# Patient Record
Sex: MALE | Employment: FULL TIME | URBAN - METROPOLITAN AREA
[De-identification: names, ages, dates, MRNs, and addresses within clinical notes are randomized per-mention and may not be internally consistent; named-entity substitution may affect disease eponyms.]

---

## 2022-08-23 PROBLEM — R10.84 GENERALIZED ABDOMINAL PAIN: Status: ACTIVE | Noted: 2022-08-23

## 2022-08-23 PROBLEM — K22.70 BARRETT'S ESOPHAGUS WITHOUT DYSPLASIA: Status: ACTIVE | Noted: 2022-08-23

## 2022-09-07 ENCOUNTER — LAB ENCOUNTER (OUTPATIENT)
Dept: LAB | Age: 47
End: 2022-09-07
Attending: INTERNAL MEDICINE
Payer: COMMERCIAL

## 2022-09-07 DIAGNOSIS — R10.84 GENERALIZED ABDOMINAL PAIN: ICD-10-CM

## 2022-09-07 LAB
CRYPTOSP AG STL QL IA: NEGATIVE
G LAMBLIA AG STL QL IA: NEGATIVE

## 2022-09-07 PROCEDURE — 82656 EL-1 FECAL QUAL/SEMIQ: CPT

## 2022-09-07 PROCEDURE — 87272 CRYPTOSPORIDIUM AG IF: CPT

## 2022-09-07 PROCEDURE — 82705 FATS/LIPIDS FECES QUAL: CPT

## 2022-09-07 PROCEDURE — 87329 GIARDIA AG IA: CPT

## 2022-09-09 LAB
NEUTRAL FAT, FECAL: NORMAL
SPLIT FAT, FECAL: NORMAL

## 2022-09-10 LAB — PANCREATIC ELASTASE , FECAL: 586 UG/G

## 2022-09-25 ENCOUNTER — LAB ENCOUNTER (OUTPATIENT)
Dept: LAB | Facility: HOSPITAL | Age: 47
End: 2022-09-25
Attending: INTERNAL MEDICINE

## 2022-09-25 DIAGNOSIS — Z01.818 PRE-OP TESTING: ICD-10-CM

## 2022-09-26 LAB — SARS-COV-2 RNA RESP QL NAA+PROBE: NOT DETECTED

## 2022-09-28 PROBLEM — K29.60 EROSIVE GASTRITIS: Status: ACTIVE | Noted: 2022-09-28

## 2022-09-28 PROBLEM — Z12.11 SPECIAL SCREENING FOR MALIGNANT NEOPLASM OF COLON: Status: ACTIVE | Noted: 2022-09-28

## 2022-09-28 PROBLEM — K63.5 COLON POLYPS: Status: ACTIVE | Noted: 2022-09-28

## 2022-09-28 PROBLEM — D12.3 BENIGN NEOPLASM OF TRANSVERSE COLON: Status: ACTIVE | Noted: 2022-09-28

## 2022-09-28 PROBLEM — K22.9 IRREGULAR Z LINE OF ESOPHAGUS: Status: ACTIVE | Noted: 2022-09-28

## 2022-09-28 PROBLEM — R19.4 CHANGE IN BOWEL HABITS: Status: ACTIVE | Noted: 2022-09-28

## 2022-09-28 PROBLEM — K64.8 INTERNAL HEMORRHOIDS: Status: ACTIVE | Noted: 2022-09-28

## 2022-09-28 PROBLEM — D12.5 BENIGN NEOPLASM OF SIGMOID COLON: Status: ACTIVE | Noted: 2022-09-28

## 2022-10-06 ENCOUNTER — OFFICE VISIT (OUTPATIENT)
Dept: FAMILY MEDICINE CLINIC | Facility: CLINIC | Age: 47
End: 2022-10-06
Payer: COMMERCIAL

## 2022-10-06 VITALS
SYSTOLIC BLOOD PRESSURE: 110 MMHG | HEIGHT: 70 IN | DIASTOLIC BLOOD PRESSURE: 60 MMHG | WEIGHT: 168.19 LBS | BODY MASS INDEX: 24.08 KG/M2 | OXYGEN SATURATION: 97 % | RESPIRATION RATE: 16 BRPM | HEART RATE: 73 BPM

## 2022-10-06 DIAGNOSIS — L70.8 OTHER ACNE: ICD-10-CM

## 2022-10-06 DIAGNOSIS — F41.1 GAD (GENERALIZED ANXIETY DISORDER): ICD-10-CM

## 2022-10-06 DIAGNOSIS — F98.8 ATTENTION DEFICIT DISORDER (ADD) IN ADULT: ICD-10-CM

## 2022-10-06 DIAGNOSIS — Z00.00 ANNUAL PHYSICAL EXAM: Primary | ICD-10-CM

## 2022-10-06 DIAGNOSIS — F42.9 OBSESSIVE-COMPULSIVE DISORDER, UNSPECIFIED TYPE: ICD-10-CM

## 2022-10-06 PROCEDURE — 3008F BODY MASS INDEX DOCD: CPT | Performed by: FAMILY MEDICINE

## 2022-10-06 PROCEDURE — 99386 PREV VISIT NEW AGE 40-64: CPT | Performed by: FAMILY MEDICINE

## 2022-10-06 PROCEDURE — 3078F DIAST BP <80 MM HG: CPT | Performed by: FAMILY MEDICINE

## 2022-10-06 PROCEDURE — 3074F SYST BP LT 130 MM HG: CPT | Performed by: FAMILY MEDICINE

## 2022-10-06 PROCEDURE — 99213 OFFICE O/P EST LOW 20 MIN: CPT | Performed by: FAMILY MEDICINE

## 2022-10-06 RX ORDER — METHYLPHENIDATE HYDROCHLORIDE 40 MG/1
40 CAPSULE, EXTENDED RELEASE ORAL EVERY MORNING
Qty: 30 CAPSULE | Refills: 0 | Status: SHIPPED | OUTPATIENT
Start: 2022-12-07 | End: 2023-01-06

## 2022-10-06 RX ORDER — ALPRAZOLAM 1 MG/1
1 TABLET, EXTENDED RELEASE ORAL DAILY PRN
Qty: 20 TABLET | Refills: 0 | Status: SHIPPED | OUTPATIENT
Start: 2022-10-06

## 2022-10-06 RX ORDER — METHYLPHENIDATE HYDROCHLORIDE 40 MG/1
40 CAPSULE, EXTENDED RELEASE ORAL EVERY MORNING
Qty: 30 CAPSULE | Refills: 0 | Status: SHIPPED | OUTPATIENT
Start: 2022-10-06 | End: 2022-11-05

## 2022-10-06 RX ORDER — SERTRALINE HYDROCHLORIDE 100 MG/1
150 TABLET, FILM COATED ORAL DAILY
Qty: 135 TABLET | Refills: 0 | Status: SHIPPED | OUTPATIENT
Start: 2022-10-06

## 2022-10-06 RX ORDER — METHYLPHENIDATE HYDROCHLORIDE 40 MG/1
40 CAPSULE, EXTENDED RELEASE ORAL EVERY MORNING
Qty: 30 CAPSULE | Refills: 0 | Status: SHIPPED | OUTPATIENT
Start: 2022-11-06 | End: 2022-12-06

## 2022-10-10 ENCOUNTER — PATIENT MESSAGE (OUTPATIENT)
Dept: FAMILY MEDICINE CLINIC | Facility: CLINIC | Age: 47
End: 2022-10-10

## 2022-10-10 RX ORDER — ROSUVASTATIN CALCIUM 10 MG/1
10 TABLET, COATED ORAL NIGHTLY
Qty: 90 TABLET | Refills: 0 | Status: SHIPPED | OUTPATIENT
Start: 2022-10-10

## 2022-10-10 NOTE — TELEPHONE ENCOUNTER
From: Ann Gamboa  To: Jose Frey DO  Sent: 10/10/2022 10:47 AM CDT  Subject: rosuvastatin    Hi,  i need a refill of rosuvastatin as i ran out. Thanks.

## 2022-10-11 ENCOUNTER — PATIENT MESSAGE (OUTPATIENT)
Dept: FAMILY MEDICINE CLINIC | Facility: CLINIC | Age: 47
End: 2022-10-11

## 2022-10-11 NOTE — TELEPHONE ENCOUNTER
From: Janet Nance  Sent: 10/11/2022 8:09 AM CDT  To: [facility] Clinical Staff  Subject: rosuvastatin    Morning,   just wondering if Dr. Guerrero [facility] is going to submit the prescription for me. Thanks.

## 2022-10-31 ENCOUNTER — PATIENT MESSAGE (OUTPATIENT)
Dept: FAMILY MEDICINE CLINIC | Facility: CLINIC | Age: 47
End: 2022-10-31

## 2022-10-31 NOTE — TELEPHONE ENCOUNTER
From: Kristin Hayden  To: Pati Stuart DO  Sent: 10/31/2022 11:11 AM CDT  Subject: alprazolam    Jarrod Ferreira  The extended release is actually not the one i have been prescribed in the past. I did not realize it until i actually opened the bottle. Is there any way to send in a request for the non extended release?

## 2022-11-03 RX ORDER — ALPRAZOLAM 1 MG/1
TABLET ORAL
Qty: 20 TABLET | Refills: 0 | Status: SHIPPED | OUTPATIENT
Start: 2022-11-03

## 2022-11-03 NOTE — TELEPHONE ENCOUNTER
Subject line in message is regarding alprazolam ER.  10/6/22: prescription:   ALPRAZolam ER 1 MG Oral Tablet 24 Hr    Patient does not want ER for alprazolam.     Approve/deny:

## 2022-11-21 LAB
ABSOLUTE BASOPHILS: 33 CELLS/UL (ref 0–200)
ABSOLUTE EOSINOPHILS: 59 CELLS/UL (ref 15–500)
ABSOLUTE LYMPHOCYTES: 1645 CELLS/UL (ref 850–3900)
ABSOLUTE MONOCYTES: 494 CELLS/UL (ref 200–950)
ABSOLUTE NEUTROPHILS: 4271 CELLS/UL (ref 1500–7800)
ALBUMIN/GLOBULIN RATIO: 1.8 (CALC) (ref 1–2.5)
ALBUMIN: 4.6 G/DL (ref 3.6–5.1)
ALKALINE PHOSPHATASE: 72 U/L (ref 36–130)
ALT: 22 U/L (ref 9–46)
AST: 19 U/L (ref 10–40)
BASOPHILS: 0.5 %
BILIRUBIN, TOTAL: 0.6 MG/DL (ref 0.2–1.2)
BUN: 10 MG/DL (ref 7–25)
CALCIUM: 9.9 MG/DL (ref 8.6–10.3)
CARBON DIOXIDE: 30 MMOL/L (ref 20–32)
CHLORIDE: 101 MMOL/L (ref 98–110)
CHOL/HDLC RATIO: 5.3 (CALC)
CHOLESTEROL, TOTAL: 207 MG/DL
CREATININE: 1.09 MG/DL (ref 0.6–1.29)
EGFR: 84 ML/MIN/1.73M2
EOSINOPHILS: 0.9 %
GLOBULIN: 2.6 G/DL (CALC) (ref 1.9–3.7)
GLUCOSE: 98 MG/DL (ref 65–99)
HDL CHOLESTEROL: 39 MG/DL
HEMATOCRIT: 45.6 % (ref 38.5–50)
HEMOGLOBIN: 15.9 G/DL (ref 13.2–17.1)
LDL-CHOLESTEROL: 137 MG/DL (CALC)
LYMPHOCYTES: 25.3 %
MCH: 28.9 PG (ref 27–33)
MCHC: 34.9 G/DL (ref 32–36)
MCV: 82.9 FL (ref 80–100)
MONOCYTES: 7.6 %
MPV: 9.8 FL (ref 7.5–12.5)
NEUTROPHILS: 65.7 %
NON-HDL CHOLESTEROL: 168 MG/DL (CALC)
PLATELET COUNT: 225 THOUSAND/UL (ref 140–400)
POTASSIUM: 4.5 MMOL/L (ref 3.5–5.3)
PROTEIN, TOTAL: 7.2 G/DL (ref 6.1–8.1)
RDW: 13.2 % (ref 11–15)
RED BLOOD CELL COUNT: 5.5 MILLION/UL (ref 4.2–5.8)
SODIUM: 137 MMOL/L (ref 135–146)
T4, FREE: 0.9 NG/DL (ref 0.8–1.8)
TOTAL PSA: 1.6 NG/ML
TRIGLYCERIDES: 174 MG/DL
TSH: 1.57 MIU/L (ref 0.4–4.5)
VITAMIN B12: 458 PG/ML (ref 200–1100)
WHITE BLOOD CELL COUNT: 6.5 THOUSAND/UL (ref 3.8–10.8)

## 2022-11-28 DIAGNOSIS — E53.8 VITAMIN B12 DEFICIENCY: ICD-10-CM

## 2022-11-28 DIAGNOSIS — E78.00 ELEVATED CHOLESTEROL: Primary | ICD-10-CM

## 2022-12-30 ENCOUNTER — PATIENT OUTREACH (OUTPATIENT)
Facility: LOCATION | Age: 47
End: 2022-12-30

## 2023-01-01 DIAGNOSIS — F41.1 GAD (GENERALIZED ANXIETY DISORDER): ICD-10-CM

## 2023-01-01 DIAGNOSIS — F42.9 OBSESSIVE-COMPULSIVE DISORDER, UNSPECIFIED TYPE: ICD-10-CM

## 2023-01-03 RX ORDER — SERTRALINE HYDROCHLORIDE 100 MG/1
TABLET, FILM COATED ORAL
Qty: 135 TABLET | Refills: 0 | Status: SHIPPED | OUTPATIENT
Start: 2023-01-03

## 2023-01-03 RX ORDER — ROSUVASTATIN CALCIUM 10 MG/1
TABLET, COATED ORAL
Qty: 90 TABLET | Refills: 0 | Status: SHIPPED | OUTPATIENT
Start: 2023-01-03

## 2023-01-12 ENCOUNTER — HOSPITAL ENCOUNTER (EMERGENCY)
Facility: HOSPITAL | Age: 48
Discharge: HOME | End: 2023-01-12
Attending: EMERGENCY MEDICINE
Payer: COMMERCIAL

## 2023-01-12 ENCOUNTER — APPOINTMENT (OUTPATIENT)
Dept: GENERAL RADIOLOGY | Facility: HOSPITAL | Age: 48
End: 2023-01-12
Attending: EMERGENCY MEDICINE
Payer: COMMERCIAL

## 2023-01-12 VITALS
HEART RATE: 71 BPM | RESPIRATION RATE: 18 BRPM | SYSTOLIC BLOOD PRESSURE: 115 MMHG | WEIGHT: 170 LBS | HEIGHT: 70 IN | OXYGEN SATURATION: 100 % | DIASTOLIC BLOOD PRESSURE: 70 MMHG | TEMPERATURE: 97 F | BODY MASS INDEX: 24.34 KG/M2

## 2023-01-12 DIAGNOSIS — E87.6 HYPOKALEMIA: ICD-10-CM

## 2023-01-12 DIAGNOSIS — R00.2 PALPITATIONS: Primary | ICD-10-CM

## 2023-01-12 LAB
ALBUMIN SERPL-MCNC: 4.3 G/DL (ref 3.4–5)
ALBUMIN/GLOB SERPL: 1.3 {RATIO} (ref 1–2)
ALP LIVER SERPL-CCNC: 76 U/L
ALT SERPL-CCNC: 33 U/L
ANION GAP SERPL CALC-SCNC: 7 MMOL/L (ref 0–18)
AST SERPL-CCNC: 20 U/L (ref 15–37)
BASOPHILS # BLD AUTO: 0.02 X10(3) UL (ref 0–0.2)
BASOPHILS NFR BLD AUTO: 0.2 %
BILIRUB SERPL-MCNC: 0.6 MG/DL (ref 0.1–2)
BUN BLD-MCNC: 12 MG/DL (ref 7–18)
CALCIUM BLD-MCNC: 9.3 MG/DL (ref 8.5–10.1)
CHLORIDE SERPL-SCNC: 109 MMOL/L (ref 98–112)
CO2 SERPL-SCNC: 24 MMOL/L (ref 21–32)
CREAT BLD-MCNC: 1.1 MG/DL
EOSINOPHIL # BLD AUTO: 0.03 X10(3) UL (ref 0–0.7)
EOSINOPHIL NFR BLD AUTO: 0.2 %
ERYTHROCYTE [DISTWIDTH] IN BLOOD BY AUTOMATED COUNT: 12.5 %
GFR SERPLBLD BASED ON 1.73 SQ M-ARVRAT: 83 ML/MIN/1.73M2 (ref 60–?)
GLOBULIN PLAS-MCNC: 3.3 G/DL (ref 2.8–4.4)
GLUCOSE BLD-MCNC: 126 MG/DL (ref 70–99)
HCT VFR BLD AUTO: 43 %
HGB BLD-MCNC: 15 G/DL
IMM GRANULOCYTES # BLD AUTO: 0.08 X10(3) UL (ref 0–1)
IMM GRANULOCYTES NFR BLD: 0.6 %
LYMPHOCYTES # BLD AUTO: 1.03 X10(3) UL (ref 1–4)
LYMPHOCYTES NFR BLD AUTO: 8.3 %
MAGNESIUM SERPL-MCNC: 1.9 MG/DL (ref 1.6–2.6)
MCH RBC QN AUTO: 28 PG (ref 26–34)
MCHC RBC AUTO-ENTMCNC: 34.9 G/DL (ref 31–37)
MCV RBC AUTO: 80.2 FL
MONOCYTES # BLD AUTO: 0.66 X10(3) UL (ref 0.1–1)
MONOCYTES NFR BLD AUTO: 5.3 %
NEUTROPHILS # BLD AUTO: 10.6 X10 (3) UL (ref 1.5–7.7)
NEUTROPHILS # BLD AUTO: 10.6 X10(3) UL (ref 1.5–7.7)
NEUTROPHILS NFR BLD AUTO: 85.4 %
OSMOLALITY SERPL CALC.SUM OF ELEC: 291 MOSM/KG (ref 275–295)
PLATELET # BLD AUTO: 190 10(3)UL (ref 150–450)
POTASSIUM SERPL-SCNC: 3.4 MMOL/L (ref 3.5–5.1)
PROT SERPL-MCNC: 7.6 G/DL (ref 6.4–8.2)
RBC # BLD AUTO: 5.36 X10(6)UL
SODIUM SERPL-SCNC: 140 MMOL/L (ref 136–145)
TROPONIN I HIGH SENSITIVITY: 5 NG/L
TSI SER-ACNC: 2.16 MIU/ML (ref 0.36–3.74)
WBC # BLD AUTO: 12.4 X10(3) UL (ref 4–11)

## 2023-01-12 PROCEDURE — 99285 EMERGENCY DEPT VISIT HI MDM: CPT

## 2023-01-12 PROCEDURE — 84484 ASSAY OF TROPONIN QUANT: CPT | Performed by: EMERGENCY MEDICINE

## 2023-01-12 PROCEDURE — 83735 ASSAY OF MAGNESIUM: CPT | Performed by: EMERGENCY MEDICINE

## 2023-01-12 PROCEDURE — 36415 COLL VENOUS BLD VENIPUNCTURE: CPT

## 2023-01-12 PROCEDURE — 93005 ELECTROCARDIOGRAM TRACING: CPT

## 2023-01-12 PROCEDURE — 84443 ASSAY THYROID STIM HORMONE: CPT | Performed by: EMERGENCY MEDICINE

## 2023-01-12 PROCEDURE — 80053 COMPREHEN METABOLIC PANEL: CPT | Performed by: EMERGENCY MEDICINE

## 2023-01-12 PROCEDURE — 85025 COMPLETE CBC W/AUTO DIFF WBC: CPT | Performed by: EMERGENCY MEDICINE

## 2023-01-12 PROCEDURE — 71045 X-RAY EXAM CHEST 1 VIEW: CPT | Performed by: EMERGENCY MEDICINE

## 2023-01-12 PROCEDURE — 93010 ELECTROCARDIOGRAM REPORT: CPT

## 2023-01-12 RX ORDER — POTASSIUM CHLORIDE 20 MEQ/1
40 TABLET, EXTENDED RELEASE ORAL ONCE
Status: COMPLETED | OUTPATIENT
Start: 2023-01-12 | End: 2023-01-12

## 2023-01-13 LAB
ATRIAL RATE: 66 BPM
P AXIS: 54 DEGREES
P-R INTERVAL: 164 MS
Q-T INTERVAL: 404 MS
QRS DURATION: 104 MS
QTC CALCULATION (BEZET): 423 MS
R AXIS: 54 DEGREES
T AXIS: 15 DEGREES
VENTRICULAR RATE: 66 BPM

## 2023-01-30 ENCOUNTER — TELEMEDICINE (OUTPATIENT)
Dept: FAMILY MEDICINE CLINIC | Facility: CLINIC | Age: 48
End: 2023-01-30

## 2023-01-30 DIAGNOSIS — E87.6 LOW POTASSIUM SYNDROME: ICD-10-CM

## 2023-01-30 DIAGNOSIS — R00.2 PALPITATIONS: Primary | ICD-10-CM

## 2023-02-03 ENCOUNTER — PATIENT MESSAGE (OUTPATIENT)
Dept: FAMILY MEDICINE CLINIC | Facility: CLINIC | Age: 48
End: 2023-02-03

## 2023-02-03 NOTE — TELEPHONE ENCOUNTER
From: Bailee Weldon  To: Beatriz Haas DO  Sent: 2/3/2023 8:37 AM CST  Subject: Hear monitor    Hi,  I was told to call a number to pickup a heart monitor and come in for blood work,however, i misplaced the number.

## 2023-02-22 ENCOUNTER — OFFICE VISIT (OUTPATIENT)
Facility: LOCATION | Age: 48
End: 2023-02-22
Payer: COMMERCIAL

## 2023-02-22 DIAGNOSIS — Z98.890 HISTORY OF REPAIR OF INGUINAL HERNIA: ICD-10-CM

## 2023-02-22 DIAGNOSIS — R19.4 CHANGE IN BOWEL HABITS: Primary | ICD-10-CM

## 2023-02-22 DIAGNOSIS — Z87.19 HISTORY OF REPAIR OF INGUINAL HERNIA: ICD-10-CM

## 2023-02-22 PROCEDURE — 99204 OFFICE O/P NEW MOD 45 MIN: CPT | Performed by: SURGERY

## 2023-04-02 DIAGNOSIS — F41.1 GAD (GENERALIZED ANXIETY DISORDER): ICD-10-CM

## 2023-04-02 DIAGNOSIS — F42.9 OBSESSIVE-COMPULSIVE DISORDER, UNSPECIFIED TYPE: ICD-10-CM

## 2023-04-05 RX ORDER — ROSUVASTATIN CALCIUM 10 MG/1
TABLET, COATED ORAL
Qty: 90 TABLET | Refills: 0 | Status: SHIPPED | OUTPATIENT
Start: 2023-04-05

## 2023-04-05 RX ORDER — SERTRALINE HYDROCHLORIDE 100 MG/1
TABLET, FILM COATED ORAL
Qty: 135 TABLET | Refills: 0 | Status: SHIPPED | OUTPATIENT
Start: 2023-04-05

## 2023-04-12 ENCOUNTER — PATIENT MESSAGE (OUTPATIENT)
Dept: FAMILY MEDICINE CLINIC | Facility: CLINIC | Age: 48
End: 2023-04-12

## 2023-04-12 NOTE — TELEPHONE ENCOUNTER
From: Christian Miller  To: Alison Wong DO  Sent: 4/12/2023 9:48 AM CDT  Subject: Evidence of Insurability Documents - John Chu -- [address] records from most recent check-up regarding treatment with Sertraline. is this something you can attach so i can send over to my insurance?

## 2023-04-14 ENCOUNTER — HOSPITAL ENCOUNTER (OUTPATIENT)
Dept: CV DIAGNOSTICS | Facility: HOSPITAL | Age: 48
Discharge: HOME | End: 2023-04-14
Attending: FAMILY MEDICINE
Payer: COMMERCIAL

## 2023-04-14 DIAGNOSIS — R00.2 PALPITATIONS: ICD-10-CM

## 2023-04-14 PROCEDURE — 93243 EXT ECG>48HR<7D SCAN A/R: CPT | Performed by: FAMILY MEDICINE

## 2023-04-14 PROCEDURE — 93242 EXT ECG>48HR<7D RECORDING: CPT | Performed by: FAMILY MEDICINE

## 2023-05-01 RX ORDER — ALPRAZOLAM 1 MG/1
TABLET ORAL
Qty: 20 TABLET | Refills: 0 | Status: SHIPPED | OUTPATIENT
Start: 2023-05-01

## 2023-05-01 RX ORDER — METHYLPHENIDATE HYDROCHLORIDE 40 MG/1
40 CAPSULE, EXTENDED RELEASE ORAL EVERY MORNING
Qty: 30 CAPSULE | Refills: 0 | Status: SHIPPED | OUTPATIENT
Start: 2023-05-01

## 2023-05-01 NOTE — TELEPHONE ENCOUNTER
Okay to fax your 1/30/23 office notes stating:  Still on sertraline - helps with baseline DWAIN and OCD  well controlled with mood // ADD not well controlled

## 2023-07-01 DIAGNOSIS — I49.3 PVC'S (PREMATURE VENTRICULAR CONTRACTIONS): ICD-10-CM

## 2023-07-03 RX ORDER — PROPRANOLOL HYDROCHLORIDE 10 MG/1
10 TABLET ORAL 2 TIMES DAILY
Qty: 60 TABLET | Refills: 0 | Status: SHIPPED | OUTPATIENT
Start: 2023-07-03

## 2023-07-12 DIAGNOSIS — F41.1 GAD (GENERALIZED ANXIETY DISORDER): ICD-10-CM

## 2023-07-12 DIAGNOSIS — F42.9 OBSESSIVE-COMPULSIVE DISORDER, UNSPECIFIED TYPE: ICD-10-CM

## 2023-07-12 NOTE — TELEPHONE ENCOUNTER
A refill request was received for:  Requested Prescriptions     Pending Prescriptions Disp Refills    ROSUVASTATIN 10 MG Oral Tab [Pharmacy Med Name: ROSUVASTATIN CALCIUM 10 MG TAB] 90 tablet 0     Sig: TAKE 1 TABLET BY MOUTH EVERY DAY AT NIGHT    SERTRALINE 100 MG Oral Tab [Pharmacy Med Name: SERTRALINE  MG TABLET] 135 tablet 0     Sig: TAKE 1.5 TABLETS (150MG TOTAL) BY MOUTH DAILY       Last refill date:   04/05/2023    Last office visit: 06/05/2023    Follow up due:  Future Appointments   Date Time Provider Verito Pate   8/4/2023  9:30 AM Mini Ocampo MD [facility]

## 2023-07-13 RX ORDER — ROSUVASTATIN CALCIUM 10 MG/1
10 TABLET, COATED ORAL NIGHTLY
Qty: 90 TABLET | Refills: 0 | Status: SHIPPED | OUTPATIENT
Start: 2023-07-13

## 2023-07-13 RX ORDER — SERTRALINE HYDROCHLORIDE 100 MG/1
150 TABLET, FILM COATED ORAL DAILY
Qty: 135 TABLET | Refills: 0 | Status: SHIPPED | OUTPATIENT
Start: 2023-07-13

## 2023-07-30 DIAGNOSIS — I49.3 PVC'S (PREMATURE VENTRICULAR CONTRACTIONS): ICD-10-CM

## 2023-07-31 RX ORDER — PROPRANOLOL HYDROCHLORIDE 10 MG/1
10 TABLET ORAL 2 TIMES DAILY
Qty: 60 TABLET | Refills: 0 | Status: SHIPPED | OUTPATIENT
Start: 2023-07-31

## 2023-08-15 DIAGNOSIS — I49.3 PVC'S (PREMATURE VENTRICULAR CONTRACTIONS): ICD-10-CM

## 2023-08-15 RX ORDER — PROPRANOLOL HYDROCHLORIDE 10 MG/1
10 TABLET ORAL 2 TIMES DAILY
Qty: 180 TABLET | Refills: 0 | Status: SHIPPED | OUTPATIENT
Start: 2023-08-15

## 2023-08-15 NOTE — TELEPHONE ENCOUNTER
.A refill request was received for:  Requested Prescriptions     Pending Prescriptions Disp Refills    propranolol 10 MG Oral Tab 180 tablet 0     Sig: Take 1 tablet (10 mg total) by mouth 2 (two) times daily.        Last refill date:   7/31/2023    Last office visit: 6/5/2023    Follow up due:  Future Appointments   Date Time Provider Verito Pate   10/6/2023  9:30 AM Maxine Rodriguez MD [facility]

## 2023-11-20 DIAGNOSIS — I49.3 PVC'S (PREMATURE VENTRICULAR CONTRACTIONS): ICD-10-CM

## 2023-11-20 RX ORDER — PROPRANOLOL HYDROCHLORIDE 10 MG/1
10 TABLET ORAL 2 TIMES DAILY
Qty: 180 TABLET | Refills: 0 | Status: SHIPPED | OUTPATIENT
Start: 2023-11-20

## 2023-12-15 DIAGNOSIS — F42.9 OBSESSIVE-COMPULSIVE DISORDER, UNSPECIFIED TYPE: ICD-10-CM

## 2023-12-15 DIAGNOSIS — F41.1 GAD (GENERALIZED ANXIETY DISORDER): ICD-10-CM

## 2023-12-15 DIAGNOSIS — I49.3 PVC'S (PREMATURE VENTRICULAR CONTRACTIONS): ICD-10-CM

## 2023-12-18 DIAGNOSIS — F41.1 GAD (GENERALIZED ANXIETY DISORDER): ICD-10-CM

## 2023-12-18 DIAGNOSIS — F42.9 OBSESSIVE-COMPULSIVE DISORDER, UNSPECIFIED TYPE: ICD-10-CM

## 2023-12-18 DIAGNOSIS — I49.3 PVC'S (PREMATURE VENTRICULAR CONTRACTIONS): ICD-10-CM

## 2023-12-18 RX ORDER — SERTRALINE HYDROCHLORIDE 100 MG/1
150 TABLET, FILM COATED ORAL DAILY
Qty: 45 TABLET | Refills: 0 | Status: SHIPPED | OUTPATIENT
Start: 2023-12-18

## 2023-12-18 RX ORDER — PROPRANOLOL HYDROCHLORIDE 10 MG/1
10 TABLET ORAL 2 TIMES DAILY
Qty: 60 TABLET | Refills: 0 | Status: SHIPPED | OUTPATIENT
Start: 2023-12-18

## 2023-12-18 RX ORDER — ALPRAZOLAM 1 MG/1
TABLET ORAL
Qty: 20 TABLET | Refills: 0 | Status: SHIPPED | OUTPATIENT
Start: 2023-12-18

## 2023-12-18 NOTE — TELEPHONE ENCOUNTER
.A refill request was received for:  Requested Prescriptions     Pending Prescriptions Disp Refills    ALPRAZolam 1 MG Oral Tab 20 tablet 0     Sig: Take one tablet by mouth daily as needed for anxiety. sertraline 100 MG Oral Tab 135 tablet 0     Sig: Take 1.5 tablets (150 mg total) by mouth daily. propranolol 10 MG Oral Tab 180 tablet 0     Sig: Take 1 tablet (10 mg total) by mouth 2 (two) times daily. Last refill date:   rosuvastatin sertraline 7/13/2023  Alprazolam 5/1/2023    Last office visit: 6/5/2023    Follow up due:  No future appointments.     A message has been sent to Pt marbin f/u

## 2023-12-19 RX ORDER — PROPRANOLOL HYDROCHLORIDE 10 MG/1
10 TABLET ORAL 2 TIMES DAILY
Qty: 180 TABLET | Refills: 0 | OUTPATIENT
Start: 2023-12-19

## 2023-12-19 RX ORDER — SERTRALINE HYDROCHLORIDE 100 MG/1
150 TABLET, FILM COATED ORAL DAILY
Qty: 135 TABLET | Refills: 0 | OUTPATIENT
Start: 2023-12-19

## 2023-12-24 DIAGNOSIS — F41.1 GAD (GENERALIZED ANXIETY DISORDER): ICD-10-CM

## 2023-12-24 DIAGNOSIS — F42.9 OBSESSIVE-COMPULSIVE DISORDER, UNSPECIFIED TYPE: ICD-10-CM

## 2023-12-24 DIAGNOSIS — I49.3 PVC'S (PREMATURE VENTRICULAR CONTRACTIONS): ICD-10-CM

## 2023-12-27 RX ORDER — PROPRANOLOL HYDROCHLORIDE 10 MG/1
10 TABLET ORAL 2 TIMES DAILY
Qty: 60 TABLET | Refills: 0 | Status: SHIPPED | OUTPATIENT
Start: 2023-12-27

## 2023-12-27 RX ORDER — SERTRALINE HYDROCHLORIDE 100 MG/1
150 TABLET, FILM COATED ORAL DAILY
Qty: 45 TABLET | Refills: 0 | Status: SHIPPED | OUTPATIENT
Start: 2023-12-27 | End: 2024-03-19

## 2023-12-27 NOTE — TELEPHONE ENCOUNTER
.A refill request was received for:  Requested Prescriptions     Pending Prescriptions Disp Refills    sertraline 100 MG Oral Tab 45 tablet 0     Sig: Take 1.5 tablets (150 mg total) by mouth daily.    propranolol 10 MG Oral Tab 60 tablet 0     Sig: Take 1 tablet (10 mg total) by mouth 2 (two) times daily.       Last refill date:   12/18/2023    Last office visit: 6/5/2023    Follow up due:  No future appointments.

## 2024-02-05 ENCOUNTER — HOSPITAL ENCOUNTER (EMERGENCY)
Age: 49
Discharge: HOME | End: 2024-02-05
Attending: EMERGENCY MEDICINE
Payer: COMMERCIAL

## 2024-02-05 ENCOUNTER — TELEPHONE (OUTPATIENT)
Dept: FAMILY MEDICINE CLINIC | Facility: CLINIC | Age: 49
End: 2024-02-05

## 2024-02-05 ENCOUNTER — APPOINTMENT (OUTPATIENT)
Dept: CT IMAGING | Age: 49
End: 2024-02-05
Attending: EMERGENCY MEDICINE
Payer: COMMERCIAL

## 2024-02-05 VITALS
BODY MASS INDEX: 25.05 KG/M2 | HEIGHT: 70 IN | HEART RATE: 52 BPM | RESPIRATION RATE: 16 BRPM | SYSTOLIC BLOOD PRESSURE: 106 MMHG | TEMPERATURE: 98 F | DIASTOLIC BLOOD PRESSURE: 78 MMHG | OXYGEN SATURATION: 97 % | WEIGHT: 175 LBS

## 2024-02-05 DIAGNOSIS — R10.31 RIGHT LOWER QUADRANT ABDOMINAL PAIN: Primary | ICD-10-CM

## 2024-02-05 LAB
ALBUMIN SERPL-MCNC: 3.9 G/DL (ref 3.4–5)
ALBUMIN/GLOB SERPL: 1.1 {RATIO} (ref 1–2)
ALP LIVER SERPL-CCNC: 78 U/L
ALT SERPL-CCNC: 34 U/L
ANION GAP SERPL CALC-SCNC: 5 MMOL/L (ref 0–18)
AST SERPL-CCNC: 14 U/L (ref 15–37)
BASOPHILS # BLD AUTO: 0.04 X10(3) UL (ref 0–0.2)
BASOPHILS NFR BLD AUTO: 0.5 %
BILIRUB SERPL-MCNC: 0.5 MG/DL (ref 0.1–2)
BILIRUB UR QL STRIP.AUTO: NEGATIVE
BUN BLD-MCNC: 12 MG/DL (ref 9–23)
CALCIUM BLD-MCNC: 8.9 MG/DL (ref 8.5–10.1)
CHLORIDE SERPL-SCNC: 106 MMOL/L (ref 98–112)
CLARITY UR REFRACT.AUTO: CLEAR
CO2 SERPL-SCNC: 26 MMOL/L (ref 21–32)
COLOR UR AUTO: YELLOW
CREAT BLD-MCNC: 1.42 MG/DL
EGFRCR SERPLBLD CKD-EPI 2021: 61 ML/MIN/1.73M2 (ref 60–?)
EOSINOPHIL # BLD AUTO: 0.1 X10(3) UL (ref 0–0.7)
EOSINOPHIL NFR BLD AUTO: 1.3 %
ERYTHROCYTE [DISTWIDTH] IN BLOOD BY AUTOMATED COUNT: 12.6 %
GLOBULIN PLAS-MCNC: 3.6 G/DL (ref 2.8–4.4)
GLUCOSE BLD-MCNC: 114 MG/DL (ref 70–99)
GLUCOSE UR STRIP.AUTO-MCNC: NEGATIVE MG/DL
HCT VFR BLD AUTO: 40.9 %
HGB BLD-MCNC: 14.6 G/DL
IMM GRANULOCYTES # BLD AUTO: 0.03 X10(3) UL (ref 0–1)
IMM GRANULOCYTES NFR BLD: 0.4 %
KETONES UR STRIP.AUTO-MCNC: NEGATIVE MG/DL
LEUKOCYTE ESTERASE UR QL STRIP.AUTO: NEGATIVE
LIPASE SERPL-CCNC: 89 U/L (ref 13–75)
LYMPHOCYTES # BLD AUTO: 1.82 X10(3) UL (ref 1–4)
LYMPHOCYTES NFR BLD AUTO: 23.7 %
MCH RBC QN AUTO: 28.4 PG (ref 26–34)
MCHC RBC AUTO-ENTMCNC: 35.7 G/DL (ref 31–37)
MCV RBC AUTO: 79.6 FL
MONOCYTES # BLD AUTO: 0.56 X10(3) UL (ref 0.1–1)
MONOCYTES NFR BLD AUTO: 7.3 %
NEUTROPHILS # BLD AUTO: 5.12 X10 (3) UL (ref 1.5–7.7)
NEUTROPHILS # BLD AUTO: 5.12 X10(3) UL (ref 1.5–7.7)
NEUTROPHILS NFR BLD AUTO: 66.8 %
NITRITE UR QL STRIP.AUTO: NEGATIVE
OSMOLALITY SERPL CALC.SUM OF ELEC: 285 MOSM/KG (ref 275–295)
PH UR STRIP.AUTO: 5.5 [PH] (ref 5–8)
PLATELET # BLD AUTO: 189 10(3)UL (ref 150–450)
POTASSIUM SERPL-SCNC: 4 MMOL/L (ref 3.5–5.1)
PROT SERPL-MCNC: 7.5 G/DL (ref 6.4–8.2)
PROT UR STRIP.AUTO-MCNC: NEGATIVE MG/DL
RBC # BLD AUTO: 5.14 X10(6)UL
RBC UR QL AUTO: NEGATIVE
SODIUM SERPL-SCNC: 137 MMOL/L (ref 136–145)
SP GR UR STRIP.AUTO: 1.02 (ref 1–1.03)
UROBILINOGEN UR STRIP.AUTO-MCNC: 0.2 MG/DL
WBC # BLD AUTO: 7.7 X10(3) UL (ref 4–11)

## 2024-02-05 PROCEDURE — 80053 COMPREHEN METABOLIC PANEL: CPT | Performed by: EMERGENCY MEDICINE

## 2024-02-05 PROCEDURE — 83690 ASSAY OF LIPASE: CPT | Performed by: EMERGENCY MEDICINE

## 2024-02-05 PROCEDURE — 96374 THER/PROPH/DIAG INJ IV PUSH: CPT

## 2024-02-05 PROCEDURE — 85025 COMPLETE CBC W/AUTO DIFF WBC: CPT | Performed by: EMERGENCY MEDICINE

## 2024-02-05 PROCEDURE — 83690 ASSAY OF LIPASE: CPT

## 2024-02-05 PROCEDURE — 96361 HYDRATE IV INFUSION ADD-ON: CPT

## 2024-02-05 PROCEDURE — 81003 URINALYSIS AUTO W/O SCOPE: CPT | Performed by: EMERGENCY MEDICINE

## 2024-02-05 PROCEDURE — 85025 COMPLETE CBC W/AUTO DIFF WBC: CPT

## 2024-02-05 PROCEDURE — 74176 CT ABD & PELVIS W/O CONTRAST: CPT | Performed by: EMERGENCY MEDICINE

## 2024-02-05 PROCEDURE — 99285 EMERGENCY DEPT VISIT HI MDM: CPT

## 2024-02-05 PROCEDURE — 99284 EMERGENCY DEPT VISIT MOD MDM: CPT

## 2024-02-05 PROCEDURE — 80053 COMPREHEN METABOLIC PANEL: CPT

## 2024-02-05 RX ORDER — KETOROLAC TROMETHAMINE 30 MG/ML
30 INJECTION, SOLUTION INTRAMUSCULAR; INTRAVENOUS ONCE
Status: COMPLETED | OUTPATIENT
Start: 2024-02-05 | End: 2024-02-05

## 2024-02-05 NOTE — TELEPHONE ENCOUNTER
Pt states he has been having right flank pain on and off.  No fevers, pain with urination or frequency or urgency.  Advised appt, pt will go to IC to be evaluated.

## 2024-02-05 NOTE — TELEPHONE ENCOUNTER
Wife said pt has sharp side discomfort.  Wants appt with Dr. Carroll.  Advised pt no avail appts and pt can go to IC or ER for eval.  Wife said pt does not want to go.  Pls call

## 2024-02-06 NOTE — ED INITIAL ASSESSMENT (HPI)
Pt to ed with complaints of right sided abdominal pain x1 month, progressively getting worse. Also complains of NVD for 1 month

## 2024-02-06 NOTE — ED PROVIDER NOTES
Patient Seen in: [facility] In [place]      History     Chief Complaint   Patient presents with    Abdomen/Flank Pain    Nausea/Vomiting/Diarrhea     Stated Complaint: right sided abdomen pain 1 month nvd    Subjective:   HPI    Patient complains that for about 1 month, he has had intermittent right lower abdominal pain rating into the right flank.  The sensation comes upon him multiple times a day.  Each episode may last 15 or 20 minutes at a time.  Is associate with some nausea.  There does not seem to be any precipitating or relieving factors.  No rash in the area.  About 2 weeks ago, patient reports having diarrhea.  He was having diarrhea about every other day up to 2 or 3 times a day without black or bloody stool.  Patient denies any burning with urination or blood in the urine but when he urinates, it is reminiscent of when he passed a kidney stone on the left side.    Objective:   Past Medical History:   Diagnosis Date    Abdominal hernia     Anxiety     Bad breath     Belching     Bloating     Blood in the stool     Calculus of kidney     Decorative tattoo     Flatulence/gas pain/belching     Food intolerance     Heartburn     Hemorrhoids     High cholesterol     Hyperlipidemia     Indigestion     Wears glasses               Past Surgical History:   Procedure Laterality Date    COLONOSCOPY      HERNIA SURGERY                  Social History     Socioeconomic History    Marital status:    Tobacco Use    Smoking status: Unknown    Smokeless tobacco: Never   Substance and Sexual Activity    Alcohol use: Never    Drug use: Never   Social History Narrative    ** Merged History Encounter **                   Review of Systems    Positive for stated complaint: right sided abdomen pain 1 month nvd  Other systems are as noted in HPI.  Constitutional and vital signs reviewed.      All other systems reviewed and negative except as noted above.    Physical Exam     ED Triage Vitals  [02/05/24 1903]   /57   Pulse 64   Resp 18   Temp 98.6 °F (37 °C)   Temp src Temporal   SpO2 98 %   O2 Device None (Room air)       Current:/78   Pulse 52   Temp 97.9 °F (36.6 °C) (Oral)   Resp 16   Ht 177.8 cm (5' 10\")   Wt 79.4 kg   SpO2 97%   BMI 25.11 kg/m²         Physical Exam  General: The patient is awake, alert, conversant.   Eyes: sclera white.  Lids and lashes are normal.  Abdomen: Soft, nondistended.  Mildly tender in the right mid abdomen into the right lower quadrant.  No CVA tenderness is noted.  Extremities: Unremarkable.  Calves nonswollen, symmetric  Skin: No rash in area patient discomfort  Neurologic:  Mental status as above.  Patient moves all extremities with good strength and coordination.           ED Course     Labs Reviewed   COMP METABOLIC PANEL (14) - Abnormal; Notable for the following components:       Result Value    Glucose 114 (*)     Creatinine 1.42 (*)     AST 14 (*)     All other components within normal limits   LIPASE - Abnormal; Notable for the following components:    Lipase 89 (*)     All other components within normal limits   CBC W/ DIFFERENTIAL - Abnormal; Notable for the following components:    MCV 79.6 (*)     All other components within normal limits   CBC WITH DIFFERENTIAL WITH PLATELET    Narrative:     The following orders were created for panel order CBC With Differential With Platelet.  Procedure                               Abnormality         Status                     ---------                               -----------         ------                     CBC W/ DIFFERENTIAL[[id number]]          Abnormal            Final result                 Please view results for these tests on the individual orders.   URINALYSIS, ROUTINE             Patient had CT scan of the abdomen back in 2017  Findings:   There is a 7 mm stone in the left kidney. No stone in the kidneys, ureters or urinary bladder. No   evidence of hydronephrosis. There is no gross  renal mass.   There is mild enlargement of the prostate.   No evidence of bowel distention or obstruction. Appendix is normal.   Cardiac size is normal. Visualized base of the lungs are clear.   With limitation of lack of contrast no gross abnormality is demonstrated liver, spleen, gallbladder,   pancreas, adrenal glands or abdominal aorta.   There is no free air or fluid in the abdomen.   There is a tiny umbilical and supraumbilical hernia which contains adipose tissues.   Minimal degenerative changes visualized spine.       Colonoscopy September 2022   FINDINGS  The quality of the preparation was adequate.  Normal terminal ileum.  2 mm sessile polyp in the transverse colon. Polypectomy performed with biopsy forcep.  Polyp retrieved. Histology pending.  4 mm sessile polyp in the sigmoid colon. Polypectomy performed with cold snare. Polyp  retrieved. Histology pending.  Otherwise normal colonic mucosa. Random colon biopsies taken with cold forceps for  histology.  Retroflexion in rectum revealed Grade II internal hemorrhoids.       MDM      As noted above, patient with a history of a large left-sided kidney stone requiring a procedure to pass.  This pain on the on the right is reminiscent.  Renal colic include the differential.  Urinary tract infection must be excluded.  Symptoms atypical of acute appendicitis.    Patient treated with IV fluid and offered pain medication      Metabolic panel shows mild elevation of creatinine 1.4 with previous 1.3  CBC shows normal white count, hemoglobin, and platelets  Lipase slightly elevated 89 of unclear clinical significance  Urinalysis shows high specific gravity with negative blood, nitrites, leukocytes, and ketones.    CT abdomen pelvis  I personally reviewed the actual radiographs themselves and my individual interpretation shows no hydronephrosis or renal calculus  radiologist's formal interpretation which I have reviewed  CONCLUSION:    1. There is stranding around  distal ileum in right lower quadrant with suggestion of wall thickening.  Findings could represent enteritis but are nonspecific on a noncontrast study.  2. The appendix is visualized separate from this and is normal.  3. There is a small amount of free fluid in right lower quadrant.       On repeat examination, patient appears comfortable.  I reviewed the results of the workup with him.  I recommend patient have follow-up with a GI doctor.  He recalls having a colonoscopy about 6 months ago, well before this pain started.  Inflammation of the terminal ileum suggests possibility of inflammatory bowel disease/Crohn's disease  I reviewed his medical records.  I advised patient to follow-up with Dr. Chery, the GI specialist who performed the procedure.  As noted above, colonoscopy at that time showed normal terminal ileum.                            Medical Decision Making      Disposition and Plan     Clinical Impression:  1. Right lower quadrant abdominal pain         Disposition:  Discharge  2/5/2024 11:16 pm    Follow-up:  Darryn Chery MD  [address] [place] [id number] [phone]    Call in 1 day(s)            Medications Prescribed:  Current Discharge Medication List

## 2024-02-06 NOTE — DISCHARGE INSTRUCTIONS
Contact your GI specialist for Perez in the morning to arrange close follow-up  Push fluids  Light diet  Tylenol or Motrin for pain

## 2024-02-22 ENCOUNTER — LAB ENCOUNTER (OUTPATIENT)
Dept: LAB | Age: 49
End: 2024-02-22
Payer: COMMERCIAL

## 2024-02-22 DIAGNOSIS — R14.0 BLOATING: ICD-10-CM

## 2024-02-22 DIAGNOSIS — K92.1 HEMATOCHEZIA: ICD-10-CM

## 2024-02-22 DIAGNOSIS — R93.3 ABNORMAL FINDING ON GI TRACT IMAGING: ICD-10-CM

## 2024-02-22 DIAGNOSIS — R11.0 NAUSEA: ICD-10-CM

## 2024-02-22 DIAGNOSIS — K52.9 ENTERITIS: ICD-10-CM

## 2024-02-22 DIAGNOSIS — R19.7 DIARRHEA, UNSPECIFIED TYPE: ICD-10-CM

## 2024-02-22 LAB
CRYPTOSP AG STL QL IA: NEGATIVE
G LAMBLIA AG STL QL IA: NEGATIVE

## 2024-02-22 PROCEDURE — 87329 GIARDIA AG IA: CPT

## 2024-02-22 PROCEDURE — 87272 CRYPTOSPORIDIUM AG IF: CPT

## 2024-02-22 PROCEDURE — 87427 SHIGA-LIKE TOXIN AG IA: CPT

## 2024-02-22 PROCEDURE — 87045 FECES CULTURE AEROBIC BACT: CPT

## 2024-02-22 PROCEDURE — 87493 C DIFF AMPLIFIED PROBE: CPT

## 2024-02-22 PROCEDURE — 89055 LEUKOCYTE ASSESSMENT FECAL: CPT

## 2024-02-22 PROCEDURE — 87046 STOOL CULTR AEROBIC BACT EA: CPT

## 2024-02-22 PROCEDURE — 87077 CULTURE AEROBIC IDENTIFY: CPT

## 2024-02-23 LAB — C DIFF TOX B STL QL: NEGATIVE

## 2024-03-19 DIAGNOSIS — F42.9 OBSESSIVE-COMPULSIVE DISORDER, UNSPECIFIED TYPE: ICD-10-CM

## 2024-03-19 DIAGNOSIS — F41.1 GAD (GENERALIZED ANXIETY DISORDER): ICD-10-CM

## 2024-03-19 PROBLEM — R93.3 ABNORMAL FINDINGS ON DIAGNOSTIC IMAGING OF DIGESTIVE SYSTEM: Status: ACTIVE | Noted: 2024-03-19

## 2024-03-19 PROBLEM — K92.1 HEMATOCHEZIA: Status: ACTIVE | Noted: 2024-03-19

## 2024-03-19 PROBLEM — R12 CHRONIC HEARTBURN: Status: ACTIVE | Noted: 2024-03-19

## 2024-03-19 PROBLEM — R10.31 ABDOMINAL PAIN, RIGHT LOWER QUADRANT: Status: ACTIVE | Noted: 2024-03-19

## 2024-03-19 PROBLEM — R14.1 ABDOMINAL GAS PAIN: Status: ACTIVE | Noted: 2024-03-19

## 2024-03-20 ENCOUNTER — TELEPHONE (OUTPATIENT)
Dept: FAMILY MEDICINE CLINIC | Facility: CLINIC | Age: 49
End: 2024-03-20

## 2024-03-20 RX ORDER — SERTRALINE HYDROCHLORIDE 100 MG/1
150 TABLET, FILM COATED ORAL DAILY
Qty: 45 TABLET | Refills: 0 | OUTPATIENT
Start: 2024-03-20

## 2024-03-20 RX ORDER — SERTRALINE HYDROCHLORIDE 100 MG/1
150 TABLET, FILM COATED ORAL DAILY
Qty: 30 TABLET | Refills: 0 | Status: SHIPPED | OUTPATIENT
Start: 2024-03-20

## 2024-03-20 NOTE — TELEPHONE ENCOUNTER
FYI - wife canceled Monday, 3/26 appt, because pt can't miss work.  Also wanted to schedule it as a physical and not ER fu.  Wife advised Dr. Carroll scheduling in June for a physical.  Wife agreed to sched in June and add pt to the cancellation list.

## 2024-03-20 NOTE — TELEPHONE ENCOUNTER
.A refill request was received for:  Requested Prescriptions     Pending Prescriptions Disp Refills    sertraline 100 MG Oral Tab 45 tablet 0     Sig: Take 1.5 tablets (150 mg total) by mouth daily.       Last refill date:   12/27/2023    Last office visit: 6/5/2023    Follow up due:  Future Appointments   Date Time Provider Department Center   3/26/2024  9:00 AM Cici Carroll DO [facility]

## 2024-03-20 NOTE — TELEPHONE ENCOUNTER
Patient had colonoscopy/ EGD  yesterday. Dr. Chery. Patient has been experiencing rectal bleeding.more than just spotting. He also reports abdominal pain.some episodes of emesis. GI physician recommends patient discuss this with pcp. Patient is not able to keep 03/26/24 OV with Dr Carroll due to mandatory work meeting. Waiting on biopsies. Given Omeprazole 40 mg daily. Dr Chery's report is available to view. Patient is requesting  if can be seen sooner.  Please advise.

## 2024-03-20 NOTE — TELEPHONE ENCOUNTER
Patient spouse called stating patient been bleeding out of the bottom area for about two weeks now and is in severe pain. Patient just had a Colon/EGD done on 03/19/2024 and needs to follow up with Dr. GREGG.      Patient spouse stated if patient can be given a call for this issue.[phone]    Patient have a appointment for 03/26/2024 for physical and other concerns.    Please advise

## 2024-04-06 DIAGNOSIS — F41.1 GAD (GENERALIZED ANXIETY DISORDER): ICD-10-CM

## 2024-04-06 DIAGNOSIS — F42.9 OBSESSIVE-COMPULSIVE DISORDER, UNSPECIFIED TYPE: ICD-10-CM

## 2024-04-06 DIAGNOSIS — I49.3 PVC'S (PREMATURE VENTRICULAR CONTRACTIONS): ICD-10-CM

## 2024-04-08 RX ORDER — SERTRALINE HYDROCHLORIDE 100 MG/1
150 TABLET, FILM COATED ORAL DAILY
Qty: 30 TABLET | Refills: 0 | Status: SHIPPED | OUTPATIENT
Start: 2024-04-08

## 2024-04-08 RX ORDER — PROPRANOLOL HYDROCHLORIDE 10 MG/1
10 TABLET ORAL 2 TIMES DAILY
Qty: 60 TABLET | Refills: 0 | Status: SHIPPED | OUTPATIENT
Start: 2024-04-08

## 2024-04-08 NOTE — TELEPHONE ENCOUNTER
A refill request was received for:  Requested Prescriptions     Pending Prescriptions Disp Refills    SERTRALINE 100 MG Oral Tab [Pharmacy Med Name: SERTRALINE  MG TABLET] 30 tablet 0     Sig: TAKE 1.5 TABLETS (150MG TOTAL) BY MOUTH DAILY    PROPRANOLOL 10 MG Oral Tab [Pharmacy Med Name: PROPRANOLOL 10 MG TABLET] 60 tablet 0     Sig: TAKE 1 TABLET BY MOUTH TWICE A DAY       Last refill date:  12/27/2023, 03/20/2023\4    Last office visit: 06/05/2023    Follow up due:  Future Appointments   Date Time Provider Department Center   6/18/2024 10:00 AM Cici Carroll DO [facility]

## 2024-04-28 DIAGNOSIS — F42.9 OBSESSIVE-COMPULSIVE DISORDER, UNSPECIFIED TYPE: ICD-10-CM

## 2024-04-28 DIAGNOSIS — F41.1 GAD (GENERALIZED ANXIETY DISORDER): ICD-10-CM

## 2024-04-28 DIAGNOSIS — I49.3 PVC'S (PREMATURE VENTRICULAR CONTRACTIONS): ICD-10-CM

## 2024-04-29 RX ORDER — SERTRALINE HYDROCHLORIDE 100 MG/1
150 TABLET, FILM COATED ORAL DAILY
Qty: 30 TABLET | Refills: 0 | OUTPATIENT
Start: 2024-04-29

## 2024-04-29 RX ORDER — PROPRANOLOL HYDROCHLORIDE 10 MG/1
10 TABLET ORAL 2 TIMES DAILY
Qty: 60 TABLET | Refills: 0 | OUTPATIENT
Start: 2024-04-29

## 2024-04-29 NOTE — TELEPHONE ENCOUNTER
.A refill request was received for:  Requested Prescriptions     Pending Prescriptions Disp Refills    SERTRALINE 100 MG Oral Tab [Pharmacy Med Name: SERTRALINE  MG TABLET] 30 tablet 0     Sig: TAKE 1.5 TABLETS (150MG TOTAL) BY MOUTH DAILY    PROPRANOLOL 10 MG Oral Tab [Pharmacy Med Name: PROPRANOLOL 10 MG TABLET] 60 tablet 0     Sig: TAKE 1 TABLET BY MOUTH TWICE A DAY       Last refill date:   4/8/2024    Last office visit: 6/5/2023    Follow up due:  Future Appointments   Date Time Provider Department Center   6/18/2024 10:00 AM Cici Carroll DO [facility]

## 2024-05-06 ENCOUNTER — PATIENT MESSAGE (OUTPATIENT)
Dept: FAMILY MEDICINE CLINIC | Facility: CLINIC | Age: 49
End: 2024-05-06

## 2024-05-06 DIAGNOSIS — F42.9 OBSESSIVE-COMPULSIVE DISORDER, UNSPECIFIED TYPE: ICD-10-CM

## 2024-05-06 DIAGNOSIS — F41.1 GAD (GENERALIZED ANXIETY DISORDER): ICD-10-CM

## 2024-05-06 RX ORDER — SERTRALINE HYDROCHLORIDE 100 MG/1
150 TABLET, FILM COATED ORAL DAILY
Qty: 60 TABLET | Refills: 0 | Status: CANCELLED | OUTPATIENT
Start: 2024-05-06

## 2024-05-06 NOTE — TELEPHONE ENCOUNTER
From: Jarvis Trujillo  To: Cici Carroll  Sent: 5/6/2024 12:43 PM CDT  Subject: prescription refill    Hi,  I submitted a request to refill sertraline, I have an appointment scheduled for next month. Please allow me more than 1 refill. I am completely out. Thanks.

## 2024-05-07 RX ORDER — SERTRALINE HYDROCHLORIDE 100 MG/1
150 TABLET, FILM COATED ORAL DAILY
Qty: 20 TABLET | Refills: 0 | Status: SHIPPED | OUTPATIENT
Start: 2024-05-07

## 2024-05-07 NOTE — TELEPHONE ENCOUNTER
Pt did schedule sooner appt on 5/20/24 VV to discuss med.    He also has px on 6/18 scheduled in person.      Please approve RX until his appt 5/20, #20 tabs pended.

## 2024-06-18 ENCOUNTER — OFFICE VISIT (OUTPATIENT)
Dept: FAMILY MEDICINE CLINIC | Facility: CLINIC | Age: 49
End: 2024-06-18

## 2024-06-18 VITALS
DIASTOLIC BLOOD PRESSURE: 72 MMHG | RESPIRATION RATE: 16 BRPM | HEIGHT: 70 IN | SYSTOLIC BLOOD PRESSURE: 114 MMHG | HEART RATE: 62 BPM | WEIGHT: 171 LBS | BODY MASS INDEX: 24.48 KG/M2 | OXYGEN SATURATION: 98 %

## 2024-06-18 DIAGNOSIS — R06.83 SNORING: ICD-10-CM

## 2024-06-18 DIAGNOSIS — F41.1 GAD (GENERALIZED ANXIETY DISORDER): ICD-10-CM

## 2024-06-18 DIAGNOSIS — F42.9 OBSESSIVE-COMPULSIVE DISORDER, UNSPECIFIED TYPE: ICD-10-CM

## 2024-06-18 DIAGNOSIS — D22.9 ATYPICAL NEVI: ICD-10-CM

## 2024-06-18 DIAGNOSIS — Z00.00 ANNUAL PHYSICAL EXAM: Primary | ICD-10-CM

## 2024-06-18 DIAGNOSIS — F98.8 ATTENTION DEFICIT DISORDER (ADD) IN ADULT: ICD-10-CM

## 2024-06-18 PROCEDURE — 99214 OFFICE O/P EST MOD 30 MIN: CPT | Performed by: FAMILY MEDICINE

## 2024-06-18 PROCEDURE — 99396 PREV VISIT EST AGE 40-64: CPT | Performed by: FAMILY MEDICINE

## 2024-06-18 RX ORDER — LISDEXAMFETAMINE DIMESYLATE 50 MG/1
50 TABLET, CHEWABLE ORAL DAILY
Qty: 30 TABLET | Refills: 0 | Status: SHIPPED | OUTPATIENT
Start: 2024-06-18 | End: 2024-07-18

## 2024-06-18 RX ORDER — LISDEXAMFETAMINE DIMESYLATE 50 MG/1
50 TABLET, CHEWABLE ORAL DAILY
Qty: 30 TABLET | Refills: 0 | Status: SHIPPED | OUTPATIENT
Start: 2024-07-19 | End: 2024-08-18

## 2024-06-18 RX ORDER — LISDEXAMFETAMINE DIMESYLATE 50 MG/1
50 TABLET, CHEWABLE ORAL DAILY
Qty: 30 TABLET | Refills: 0 | Status: SHIPPED | OUTPATIENT
Start: 2024-08-19 | End: 2024-09-18

## 2024-06-18 NOTE — PROGRESS NOTES
----- Message from Alis Lee MD sent at 7/26/2020  3:46 PM EDT -----  Echocardiogram did not show any significant pathology  Jarvis Trujillo is a 48 year old male who presents for a complete physical exam.   HPI:   Pt complains of GI issues     No calcium and vit D   No urinary symptoms  No erectile dysfunction  No penile discharge  + snoring   No family h/o colon and prostate cancer   [facility] - recent EGD   Recent c-scope     Here with wife    Seeing GI for chronic pain   S/p treatment for h pylori and still with pain / finished meds but missed a few days in the middle   Has f/u appt / given dietician info     Wt Readings from Last 6 Encounters:   06/18/24 171 lb (77.6 kg)   02/21/24 179 lb 9.6 oz (81.5 kg)   02/05/24 175 lb (79.4 kg)   06/05/23 170 lb (77.1 kg)   01/12/23 170 lb (77.1 kg)   11/22/22 173 lb 11.2 oz (78.8 kg)     Body mass index is 24.54 kg/m².     CHOLESTEROL, TOTAL (mg/dL)   Date Value   11/19/2022 207 (H)     HDL CHOLESTEROL (mg/dL)   Date Value   11/19/2022 39 (L)     LDL-CHOLESTEROL (mg/dL (calc))   Date Value   11/19/2022 137 (H)     AST (U/L)   Date Value   02/05/2024 14 (L)   03/11/2023 15   01/12/2023 20   11/19/2022 19     ALT (U/L)   Date Value   02/05/2024 34   03/11/2023 18   01/12/2023 33   11/19/2022 22      Current Outpatient Medications   Medication Sig Dispense Refill    Methylphenidate HCl ER, XR, 30 MG Oral Capsule SR 24 Hr Take 1 capsule by mouth daily. 30 capsule 0    [START ON 6/20/2024] Methylphenidate HCl ER, XR, 30 MG Oral Capsule SR 24 Hr Take 1 capsule by mouth daily. 30 capsule 0    [START ON 7/20/2024] Methylphenidate HCl ER, XR, 30 MG Oral Capsule SR 24 Hr Take 1 capsule by mouth daily. 30 capsule 0    ALPRAZolam 1 MG Oral Tab Take one tablet by mouth daily as needed for anxiety. 30 tablet 0    sertraline 100 MG Oral Tab Take 1.5 tablets (150 mg total) by mouth daily. 135 tablet 0    propranolol 10 MG Oral Tab Take 1 tablet (10 mg total) by mouth 2 (two) times daily. 60 tablet 0    metRONIDAZOLE 500 MG Oral Tab Take one po TID for 14 days 42 tablet 0    Omeprazole 40 MG Oral Capsule  Delayed Release Take 1 capsule (40 mg total) by mouth daily. 90 capsule 0    Methylphenidate HCl ER, LA, 40 MG Oral Capsule SR 24 Hr Take 1 capsule (40 mg total) by mouth every morning. 30 capsule 0    rosuvastatin 10 MG Oral Tab Take 1 tablet (10 mg total) by mouth nightly. 90 tablet 0      Past Medical History:    Abdominal hernia    Abdominal pain    Anxiety    Bad breath    Belching    Bloating    Blood in the stool    Calculus of kidney    Decorative tattoo    Diarrhea, unspecified    Fatigue    Flatulence/gas pain/belching    Food intolerance    Heartburn    Hemorrhoids    High cholesterol    Hyperlipidemia    Indigestion    Nausea    Wears glasses      Past Surgical History:   Procedure Laterality Date    Colonoscopy      Hernia surgery        Family History   Problem Relation Age of Onset    Ulcerative Colitis Sister       Social History:  Social History     Socioeconomic History    Marital status:    Tobacco Use    Smoking status: Never    Smokeless tobacco: Never   Substance and Sexual Activity    Alcohol use: Never    Drug use: Never   Social History Narrative    ** Merged History Encounter **           Occ: . : . 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   Exercise: 1-2 x per week  Diet: healthy      REVIEW OF SYSTEMS:   GENERAL: feels well otherwise  SKIN: denies any unusual skin lesions  EYES:denies blurred vision or double vision  HEENT: denies nasal congestion, sinus pain or ST  LUNGS: denies shortness of breath with exertion  CARDIOVASCULAR: denies chest pain on exertion  GI: denies abdominal pain,denies heartburn  : denies nocturia or changes in stream  MUSCULOSKELETAL: denies back pain  NEURO: denies headaches  PSYCHE: denies depression or anxiety  HEMATOLOGIC: denies hx of anemia  ENDOCRINE: denies thyroid history  ALL/ASTHMA: denies asthma    EXAM:   /72   Pulse 62   Resp 16   Ht 5' 10\" (1.778 m)   Wt 171 lb (77.6 kg)   SpO2 98%   BMI 24.54 kg/m²   Body mass index is 24.54  kg/m².   GENERAL: well developed, well nourished,in no apparent distress  SKIN: no rashes,no suspicious lesions  HEENT: atraumatic, normocephalic,ears and throat are clear  EYES:PERRLA, EOMI, normal optic disk,conjunctiva are clear  NECK: supple,no adenopathy,no bruits  CHEST: no chest tenderness  BREAST: no dominant or suspicious mass  LUNGS: clear to auscultation  CARDIO: RRR without murmur  GI: good BS's,no masses, HSM or tenderness  : two descended testes,no masses,no hernia,no penile lesions  RECTAL: good rectal tone, prostate shows no masses, stool is OB negative  MUSCULOSKELETAL: back is not tender,FROM of the back  EXTREMITIES: no cyanosis, clubbing or edema  NEURO: Oriented times three,cranial nerves are intact,motor and sensory are grossly intact    ASSESSMENT AND PLAN:   Jarvis Trujillo is a 48 year old male who presents with     1. Annual physical exam      5. Snoring    - OP REFERRAL TO DIAGNOSTIC SLEEP STUDY    6. Atypical nevi    - Derm Referral - In Network          Discussed diet, exercise, calcium, vit D and fish oil.    The patient indicates understanding of these issues and agrees to the plan.  The patient is asked to return in 3 mo or sooner if needed.        Jarvis Trujillo is a 48 year old male here to discuss OCD, DWAIN, ADD     Xanax prn - more with travel     ADD meds - helps calm him down and focus     Still on sertraline - helps with baseline DWAIN and OCD - working well   Taking propanolol as needed   Minimal anxiety   No depression   Not sleeping well due to GI issues   well controlled with mood // ADD not well controlled   no SI/HI  Nl appetite typically but worse with h pylori infection   No anhedonia  No hopelessness    ADD - takes meds as needed  Patient denies chest pain, shortness of breath, dizziness, and lightheadedness. No exertional symptoms.  Some heart racing     Pt was diagnosed with h pylori   Under treatment       HPI:     Current Outpatient Medications    Medication Sig Dispense Refill    Methylphenidate HCl ER, XR, 30 MG Oral Capsule SR 24 Hr Take 1 capsule by mouth daily. 30 capsule 0    [START ON 6/20/2024] Methylphenidate HCl ER, XR, 30 MG Oral Capsule SR 24 Hr Take 1 capsule by mouth daily. 30 capsule 0    [START ON 7/20/2024] Methylphenidate HCl ER, XR, 30 MG Oral Capsule SR 24 Hr Take 1 capsule by mouth daily. 30 capsule 0    ALPRAZolam 1 MG Oral Tab Take one tablet by mouth daily as needed for anxiety. 30 tablet 0    sertraline 100 MG Oral Tab Take 1.5 tablets (150 mg total) by mouth daily. 135 tablet 0    propranolol 10 MG Oral Tab Take 1 tablet (10 mg total) by mouth 2 (two) times daily. 60 tablet 0    metRONIDAZOLE 500 MG Oral Tab Take one po TID for 14 days 42 tablet 0    Omeprazole 40 MG Oral Capsule Delayed Release Take 1 capsule (40 mg total) by mouth daily. 90 capsule 0    Methylphenidate HCl ER, LA, 40 MG Oral Capsule SR 24 Hr Take 1 capsule (40 mg total) by mouth every morning. 30 capsule 0    rosuvastatin 10 MG Oral Tab Take 1 tablet (10 mg total) by mouth nightly. 90 tablet 0      Past Medical History:    Abdominal hernia    Abdominal pain    Anxiety    Bad breath    Belching    Bloating    Blood in the stool    Calculus of kidney    Decorative tattoo    Diarrhea, unspecified    Fatigue    Flatulence/gas pain/belching    Food intolerance    Heartburn    Hemorrhoids    High cholesterol    Hyperlipidemia    Indigestion    Nausea    Wears glasses      Social History:  Social History     Socioeconomic History    Marital status:    Tobacco Use    Smoking status: Never    Smokeless tobacco: Never   Substance and Sexual Activity    Alcohol use: Never    Drug use: Never   Social History Narrative    ** Merged History Encounter **             REVIEW OF SYSTEMS:   GENERAL HEALTH: feels well otherwise  SKIN: denies any unusual skin lesions or rashes  RESPIRATORY: denies shortness of breath with exertion  CARDIOVASCULAR: denies chest pain on  exertion  GI: denies abdominal pain and denies heartburn  NEURO: denies headaches      EXAM:   alert, appears stated age and cooperative, Normocephalic, without obvious abnormality, atraumatic, lips, mucosa, and tongue normal; teeth and gums normal, Speaking in full sentences comfortably, Normal work of breathing, Skin color, texture, turgor normal. No rashes or lesions and age appropriate, normal, logical connections, person, place and time/date and no suicidal ideation      ASSESSMENT AND PLAN:     1. DWAIN (generalized anxiety disorder)    - ALPRAZolam 1 MG Oral Tab; Take one tablet by mouth daily as needed for anxiety.  Dispense: 30 tablet; Refill: 0  - sertraline 100 MG Oral Tab; Take 1.5 tablets (150 mg total) by mouth daily.  Dispense: 135 tablet; Refill: 0    2. Obsessive-compulsive disorder, unspecified type    - sertraline 100 MG Oral Tab; Take 1.5 tablets (150 mg total) by mouth daily.  Dispense: 135 tablet; Refill: 0    3. Attention deficit disorder (ADD) in adult  Reduce dose   - Methylphenidate HCl ER, XR, 30 MG Oral Capsule SR 24 Hr; Take 1 capsule by mouth daily.  Dispense: 30 capsule; Refill: 0  - Methylphenidate HCl ER, XR, 30 MG Oral Capsule SR 24 Hr; Take 1 capsule by mouth daily.  Dispense: 30 capsule; Refill: 0  - Methylphenidate HCl ER, XR, 30 MG Oral Capsule SR 24 Hr; Take 1 capsule by mouth daily.  Dispense: 30 capsule; Refill: 0    4. General medical exam    - VITAMIN D, 25-HYDROXY [62231][Q]; Future  - Free T4, (Free Thyroxine); Future  - Assay, Thyroid Stim Hormone; Future  - Lipid Panel; Future  - CBC With Differential With Platelet; Future  - Comp Metabolic Panel (14); Future  - Vitamin B12; Future  - Hemoglobin A1C; Future  - PSA, TOTAL W REFLEX TO PSA, FREE [[id number]][Q]  - VITAMIN D, 25-HYDROXY [07929][Q]  - Free T4, (Free Thyroxine)  - Assay, Thyroid Stim Hormone  - Lipid Panel  - CBC With Differential With Platelet  - Comp Metabolic Panel (14)  - Vitamin B12  - Hemoglobin  A1C      Potential side effects discussed at length  The patient indicates understanding of these issues and agrees to the plan.  The patient is asked to return in 1 mo or sooner if needed.            Jarvis Trujillo is a 48 year old male here to discuss OCD, DWAIN, ADD     Pt has been out of meds for 8 months - restarted and new dose die not seem to be enough   Went back to older higher dose but still not working great     Xanax prn - more with travel       ADD - helps calm him down and focus     Still on sertraline - helps with baseline DWAIN and OCD  well controlled with mood // ADD not well controlled   no SI/HI  Nl insomnia/poor sleep  Nl appetite  No anhedonia  No hopelessness  Not anxious/overwhelmed  Not depressed   Poor concentration   Not seeing counselor currently / better now   Denies side effects    HPI:     Current Outpatient Medications   Medication Sig Dispense Refill    Methylphenidate HCl ER, XR, 30 MG Oral Capsule SR 24 Hr Take 1 capsule by mouth daily. 30 capsule 0    [START ON 6/20/2024] Methylphenidate HCl ER, XR, 30 MG Oral Capsule SR 24 Hr Take 1 capsule by mouth daily. 30 capsule 0    [START ON 7/20/2024] Methylphenidate HCl ER, XR, 30 MG Oral Capsule SR 24 Hr Take 1 capsule by mouth daily. 30 capsule 0    ALPRAZolam 1 MG Oral Tab Take one tablet by mouth daily as needed for anxiety. 30 tablet 0    sertraline 100 MG Oral Tab Take 1.5 tablets (150 mg total) by mouth daily. 135 tablet 0    propranolol 10 MG Oral Tab Take 1 tablet (10 mg total) by mouth 2 (two) times daily. 60 tablet 0    metRONIDAZOLE 500 MG Oral Tab Take one po TID for 14 days 42 tablet 0    Omeprazole 40 MG Oral Capsule Delayed Release Take 1 capsule (40 mg total) by mouth daily. 90 capsule 0    Methylphenidate HCl ER, LA, 40 MG Oral Capsule SR 24 Hr Take 1 capsule (40 mg total) by mouth every morning. 30 capsule 0    rosuvastatin 10 MG Oral Tab Take 1 tablet (10 mg total) by mouth nightly. 90 tablet 0      Past Medical  History:    Abdominal hernia    Abdominal pain    Anxiety    Bad breath    Belching    Bloating    Blood in the stool    Calculus of kidney    Decorative tattoo    Diarrhea, unspecified    Fatigue    Flatulence/gas pain/belching    Food intolerance    Heartburn    Hemorrhoids    High cholesterol    Hyperlipidemia    Indigestion    Nausea    Wears glasses      Social History:  Social History     Socioeconomic History    Marital status:    Tobacco Use    Smoking status: Never    Smokeless tobacco: Never   Substance and Sexual Activity    Alcohol use: Never    Drug use: Never   Social History Narrative    ** Merged History Encounter **             REVIEW OF SYSTEMS:   GENERAL HEALTH: feels well otherwise  SKIN: denies any unusual skin lesions or rashes  RESPIRATORY: denies shortness of breath with exertion  CARDIOVASCULAR: denies chest pain on exertion  GI: denies abdominal pain and denies heartburn  NEURO: denies headaches      EXAM:   /72   Pulse 62   Resp 16   Ht 5' 10\" (1.778 m)   Wt 171 lb (77.6 kg)   SpO2 98%   BMI 24.54 kg/m²   GENERAL: well developed, well nourished,in no apparent distress  PSYCH: normal affect, good eye contact, appropriate  NEURO: CN intact  NECK: supple no LAD, no thyromegaly     ASSESSMENT AND PLAN:     2. DWAIN (generalized anxiety disorder)  Cpm     3. Obsessive-compulsive disorder, unspecified type  Cpm     4. Attention deficit disorder (ADD) in adult  Trial of different meds   - Lisdexamfetamine Dimesylate (VYVANSE) 50 MG Oral Chew Tab; Chew 50 mg by mouth daily.  Dispense: 30 tablet; Refill: 0  - Lisdexamfetamine Dimesylate (VYVANSE) 50 MG Oral Chew Tab; Chew 50 mg by mouth daily.  Dispense: 30 tablet; Refill: 0  - Lisdexamfetamine Dimesylate (VYVANSE) 50 MG Oral Chew Tab; Chew 50 mg by mouth daily.  Dispense: 30 tablet; Refill: 0    Potential side effects discussed at length  The patient indicates understanding of these issues and agrees to the plan.  The patient is  asked to return in 3 months or sooner if needed.

## 2024-06-27 ENCOUNTER — LAB ENCOUNTER (OUTPATIENT)
Dept: LAB | Age: 49
End: 2024-06-27
Attending: STUDENT IN AN ORGANIZED HEALTH CARE EDUCATION/TRAINING PROGRAM
Payer: COMMERCIAL

## 2024-06-27 DIAGNOSIS — A04.8 H. PYLORI INFECTION: ICD-10-CM

## 2024-06-27 PROCEDURE — 87338 HPYLORI STOOL AG IA: CPT

## 2024-06-29 LAB — H PYLORI AG STL QL IA: POSITIVE

## 2024-08-13 ENCOUNTER — PATIENT MESSAGE (OUTPATIENT)
Dept: FAMILY MEDICINE CLINIC | Facility: CLINIC | Age: 49
End: 2024-08-13

## 2024-08-13 DIAGNOSIS — F42.9 OBSESSIVE-COMPULSIVE DISORDER, UNSPECIFIED TYPE: ICD-10-CM

## 2024-08-13 DIAGNOSIS — F98.8 ATTENTION DEFICIT DISORDER (ADD) IN ADULT: ICD-10-CM

## 2024-08-13 DIAGNOSIS — F41.1 GAD (GENERALIZED ANXIETY DISORDER): ICD-10-CM

## 2024-08-13 RX ORDER — METHYLPHENIDATE HYDROCHLORIDE 30 MG/1
1 CAPSULE, EXTENDED RELEASE ORAL DAILY
Qty: 30 CAPSULE | Refills: 0 | Status: SHIPPED | OUTPATIENT
Start: 2024-08-13 | End: 2024-08-14

## 2024-08-13 RX ORDER — SERTRALINE HYDROCHLORIDE 100 MG/1
150 TABLET, FILM COATED ORAL DAILY
Qty: 45 TABLET | Refills: 0 | Status: SHIPPED | OUTPATIENT
Start: 2024-08-13

## 2024-08-13 NOTE — TELEPHONE ENCOUNTER
From: Jarvis Trujillo  To: Cici Carroll  Sent: 8/13/2024 8:36 AM CDT  Subject: Lisdexamfetamine Dimesylate 50 MG Chew    Morning,  the above medicine did not work for me at all and would like to go back to [facility]. I would like the fast acting prescription and the not extended release as these don't work for me.

## 2024-08-13 NOTE — TELEPHONE ENCOUNTER
A refill request was received for:  Requested Prescriptions     Pending Prescriptions Disp Refills    SERTRALINE 100 MG Oral Tab [Pharmacy Med Name: SERTRALINE  MG TABLET] 135 tablet 0     Sig: TAKE 1.5 TABLETS (150MG TOTAL) BY MOUTH DAILY       Last refill date:   5/20/2024    Last office visit:  6/18/2024    Follow up due:   7/18/2024  No future appointments.

## 2024-08-14 DIAGNOSIS — I49.3 PVC'S (PREMATURE VENTRICULAR CONTRACTIONS): ICD-10-CM

## 2024-08-14 DIAGNOSIS — F98.8 ATTENTION DEFICIT DISORDER (ADD) IN ADULT: ICD-10-CM

## 2024-08-14 RX ORDER — PROPRANOLOL HYDROCHLORIDE 10 MG/1
10 TABLET ORAL 2 TIMES DAILY
Qty: 60 TABLET | Refills: 0 | Status: SHIPPED | OUTPATIENT
Start: 2024-08-14

## 2024-08-14 RX ORDER — METHYLPHENIDATE HYDROCHLORIDE 30 MG/1
1 CAPSULE, EXTENDED RELEASE ORAL DAILY
Qty: 30 CAPSULE | Refills: 0 | Status: SHIPPED | OUTPATIENT
Start: 2024-08-14 | End: 2024-09-13

## 2024-08-14 NOTE — TELEPHONE ENCOUNTER
A refill request was received for:  Requested Prescriptions     Pending Prescriptions Disp Refills    propranolol 10 MG Oral Tab 60 tablet 0     Sig: Take 1 tablet (10 mg total) by mouth 2 (two) times daily.    Methylphenidate HCl ER, XR, 30 MG Oral Capsule SR 24 Hr 30 capsule 0     Sig: Take 1 capsule by mouth daily.       Last refill date:     Propranolol 4/8/24  Methylphenidate 8/13/24    Last office visit: 6/18/24    Follow up due:  No future appointments.

## 2024-08-25 DIAGNOSIS — F41.1 GAD (GENERALIZED ANXIETY DISORDER): ICD-10-CM

## 2024-08-25 DIAGNOSIS — F42.9 OBSESSIVE-COMPULSIVE DISORDER, UNSPECIFIED TYPE: ICD-10-CM

## 2024-08-27 RX ORDER — SERTRALINE HYDROCHLORIDE 100 MG/1
150 TABLET, FILM COATED ORAL DAILY
Qty: 45 TABLET | Refills: 0 | Status: SHIPPED | OUTPATIENT
Start: 2024-08-27

## 2024-09-11 ENCOUNTER — PATIENT MESSAGE (OUTPATIENT)
Dept: FAMILY MEDICINE CLINIC | Facility: CLINIC | Age: 49
End: 2024-09-11

## 2024-09-11 NOTE — TELEPHONE ENCOUNTER
From: Jarvis Trujillo  To: Cici Carroll  Sent: 9/11/2024 9:36 AM CDT  Subject: Blood Work    Hi,  Which [facility] facility can i go to get blood work done? if you can please provide me an address. Thanks.

## 2024-09-23 RX ORDER — ERGOCALCIFEROL 1.25 MG/1
50000 CAPSULE, LIQUID FILLED ORAL WEEKLY
Qty: 12 CAPSULE | Refills: 0 | OUTPATIENT
Start: 2024-09-23

## 2024-09-25 ENCOUNTER — LAB ENCOUNTER (OUTPATIENT)
Dept: LAB | Age: 49
End: 2024-09-25
Attending: FAMILY MEDICINE
Payer: COMMERCIAL

## 2024-09-25 ENCOUNTER — LAB ENCOUNTER (OUTPATIENT)
Dept: LAB | Age: 49
End: 2024-09-25
Attending: INTERNAL MEDICINE
Payer: COMMERCIAL

## 2024-09-25 DIAGNOSIS — E78.2 MIXED HYPERLIPIDEMIA: ICD-10-CM

## 2024-09-25 DIAGNOSIS — A04.8 H. PYLORI INFECTION: ICD-10-CM

## 2024-09-25 LAB
ALBUMIN SERPL-MCNC: 4.7 G/DL (ref 3.2–4.8)
ALBUMIN/GLOB SERPL: 1.7 {RATIO} (ref 1–2)
ALP LIVER SERPL-CCNC: 75 U/L
ALT SERPL-CCNC: 27 U/L
ANION GAP SERPL CALC-SCNC: 5 MMOL/L (ref 0–18)
AST SERPL-CCNC: 23 U/L (ref ?–34)
BILIRUB SERPL-MCNC: 0.8 MG/DL (ref 0.3–1.2)
BUN BLD-MCNC: 11 MG/DL (ref 9–23)
CALCIUM BLD-MCNC: 10 MG/DL (ref 8.7–10.4)
CHLORIDE SERPL-SCNC: 106 MMOL/L (ref 98–112)
CHOLEST SERPL-MCNC: 180 MG/DL (ref ?–200)
CO2 SERPL-SCNC: 28 MMOL/L (ref 21–32)
CREAT BLD-MCNC: 1.49 MG/DL
EGFRCR SERPLBLD CKD-EPI 2021: 57 ML/MIN/1.73M2 (ref 60–?)
FASTING PATIENT LIPID ANSWER: YES
FASTING STATUS PATIENT QL REPORTED: YES
GLOBULIN PLAS-MCNC: 2.7 G/DL (ref 2–3.5)
GLUCOSE BLD-MCNC: 99 MG/DL (ref 70–99)
HDLC SERPL-MCNC: 39 MG/DL (ref 40–59)
LDLC SERPL CALC-MCNC: 117 MG/DL (ref ?–100)
NONHDLC SERPL-MCNC: 141 MG/DL (ref ?–130)
OSMOLALITY SERPL CALC.SUM OF ELEC: 287 MOSM/KG (ref 275–295)
POTASSIUM SERPL-SCNC: 4.4 MMOL/L (ref 3.5–5.1)
PROT SERPL-MCNC: 7.4 G/DL (ref 5.7–8.2)
SODIUM SERPL-SCNC: 139 MMOL/L (ref 136–145)
TRIGL SERPL-MCNC: 133 MG/DL (ref 30–149)
VLDLC SERPL CALC-MCNC: 23 MG/DL (ref 0–30)

## 2024-09-25 PROCEDURE — 80061 LIPID PANEL: CPT

## 2024-09-25 PROCEDURE — 36415 COLL VENOUS BLD VENIPUNCTURE: CPT

## 2024-09-25 PROCEDURE — 87338 HPYLORI STOOL AG IA: CPT

## 2024-09-25 PROCEDURE — 80053 COMPREHEN METABOLIC PANEL: CPT

## 2024-09-26 LAB — H PYLORI AG STL QL IA: NEGATIVE

## 2024-09-30 DIAGNOSIS — F41.9 ANXIETY: ICD-10-CM

## 2024-09-30 RX ORDER — PROPRANOLOL HCL 20 MG
20 TABLET ORAL 3 TIMES DAILY
Qty: 90 TABLET | Refills: 0 | Status: SHIPPED | OUTPATIENT
Start: 2024-09-30

## 2024-11-06 DIAGNOSIS — F41.9 ANXIETY: ICD-10-CM

## 2024-11-06 NOTE — TELEPHONE ENCOUNTER
A refill request was received for:  Requested Prescriptions     Pending Prescriptions Disp Refills    propranolol 20 MG Oral Tab 270 tablet 0     Sig: Take 1 tablet (20 mg total) by mouth 3 (three) times daily.       Last refill date:   9/30/24    Last office visit: 6/18/24    Follow up due:Due for follow up.  No future appointments.

## 2024-11-07 RX ORDER — PROPRANOLOL HCL 20 MG
20 TABLET ORAL 3 TIMES DAILY
Qty: 270 TABLET | Refills: 0 | Status: SHIPPED | OUTPATIENT
Start: 2024-11-07

## 2024-12-08 DIAGNOSIS — F41.1 GAD (GENERALIZED ANXIETY DISORDER): ICD-10-CM

## 2024-12-08 DIAGNOSIS — F42.9 OBSESSIVE-COMPULSIVE DISORDER, UNSPECIFIED TYPE: ICD-10-CM

## 2024-12-09 RX ORDER — SERTRALINE HYDROCHLORIDE 100 MG/1
150 TABLET, FILM COATED ORAL DAILY
Qty: 135 TABLET | Refills: 0 | Status: SHIPPED | OUTPATIENT
Start: 2024-12-09

## 2025-01-02 ENCOUNTER — PATIENT MESSAGE (OUTPATIENT)
Dept: FAMILY MEDICINE CLINIC | Facility: CLINIC | Age: 50
End: 2025-01-02

## 2025-05-16 ENCOUNTER — OFFICE VISIT (OUTPATIENT)
Dept: FAMILY MEDICINE CLINIC | Facility: CLINIC | Age: 50
End: 2025-05-16
Payer: COMMERCIAL

## 2025-05-16 VITALS
WEIGHT: 180 LBS | RESPIRATION RATE: 16 BRPM | DIASTOLIC BLOOD PRESSURE: 78 MMHG | BODY MASS INDEX: 25.77 KG/M2 | HEART RATE: 68 BPM | OXYGEN SATURATION: 98 % | HEIGHT: 70 IN | SYSTOLIC BLOOD PRESSURE: 124 MMHG

## 2025-05-16 DIAGNOSIS — M25.551 BILATERAL HIP PAIN: ICD-10-CM

## 2025-05-16 DIAGNOSIS — F98.8 ATTENTION DEFICIT DISORDER (ADD) IN ADULT: ICD-10-CM

## 2025-05-16 DIAGNOSIS — M25.552 BILATERAL HIP PAIN: ICD-10-CM

## 2025-05-16 DIAGNOSIS — F41.1 GAD (GENERALIZED ANXIETY DISORDER): ICD-10-CM

## 2025-05-16 DIAGNOSIS — F42.9 OBSESSIVE-COMPULSIVE DISORDER, UNSPECIFIED TYPE: ICD-10-CM

## 2025-05-16 DIAGNOSIS — F41.9 ANXIETY: ICD-10-CM

## 2025-05-16 DIAGNOSIS — M54.50 LUMBAR PAIN: Primary | ICD-10-CM

## 2025-05-16 PROCEDURE — 99214 OFFICE O/P EST MOD 30 MIN: CPT | Performed by: FAMILY MEDICINE

## 2025-05-16 RX ORDER — METHYLPHENIDATE HYDROCHLORIDE 20 MG/1
20 TABLET ORAL 2 TIMES DAILY
Qty: 60 TABLET | Refills: 0 | Status: SHIPPED | OUTPATIENT
Start: 2025-07-17 | End: 2025-08-16

## 2025-05-16 RX ORDER — METHYLPHENIDATE HYDROCHLORIDE 20 MG/1
20 TABLET ORAL 2 TIMES DAILY
Qty: 60 TABLET | Refills: 0 | Status: SHIPPED | OUTPATIENT
Start: 2025-06-16 | End: 2025-07-16

## 2025-05-16 RX ORDER — METHYLPHENIDATE HYDROCHLORIDE 20 MG/1
20 TABLET ORAL 2 TIMES DAILY
Qty: 60 TABLET | Refills: 0 | Status: SHIPPED | OUTPATIENT
Start: 2025-05-16 | End: 2025-06-15

## 2025-05-16 RX ORDER — SERTRALINE HYDROCHLORIDE 100 MG/1
150 TABLET, FILM COATED ORAL DAILY
Qty: 135 TABLET | Refills: 0 | Status: SHIPPED | OUTPATIENT
Start: 2025-05-16

## 2025-05-16 RX ORDER — PROPRANOLOL HCL 20 MG
20 TABLET ORAL 3 TIMES DAILY
Qty: 270 TABLET | Refills: 0 | Status: SHIPPED | OUTPATIENT
Start: 2025-05-16

## 2025-05-16 NOTE — PROGRESS NOTES
Jarvis Trujillo is a 49 year old male here to discuss OCD, DWAIN, ADD and backpain     Left sided back pain 1mo  No LE numbness tingling or weakness  Daily pain  Lifting weights     Bilateral hip pain    Pt does not stretch    Xanax prn - more with travel     ADD - current regimen working well   Patient denies chest pain, shortness of breath, dizziness, and lightheadedness. No exertional symptoms.      Still on sertraline - helps with baseline DWAIN and OCD  well controlled with mood   no SI/HI  Nl insomnia/poor sleep  Nl appetite  No anhedonia  No hopelessness  Not anxious/overwhelmed  Not depressed   Poor concentration   Not seeing counselor currently / better now   Denies side effects    HPI:     Current Outpatient Medications   Medication Sig Dispense Refill    sertraline 100 MG Oral Tab Take 1.5 tablets (150 mg total) by mouth daily. 135 tablet 0    propranolol 20 MG Oral Tab Take 1 tablet (20 mg total) by mouth 3 (three) times daily. 270 tablet 0    methylphenidate (RITALIN) 20 MG Oral Tab Take 1 tablet (20 mg total) by mouth 2 (two) times daily. 60 tablet 0    [START ON 6/16/2025] methylphenidate (RITALIN) 20 MG Oral Tab Take 1 tablet (20 mg total) by mouth 2 (two) times daily. 60 tablet 0    [START ON 7/17/2025] methylphenidate (RITALIN) 20 MG Oral Tab Take 1 tablet (20 mg total) by mouth 2 (two) times daily. 60 tablet 0    Methylphenidate HCl ER, XR, 30 MG Oral Capsule SR 24 Hr Take 1 capsule by mouth daily.      ALPRAZolam 1 MG Oral Tab Take one tablet by mouth daily as needed for anxiety. 30 tablet 0    methylphenidate (RITALIN) 20 MG Oral Tab Take 1 tablet (20 mg total) by mouth 2 (two) times daily. 60 tablet 0    rosuvastatin 10 MG Oral Tab Take 1 tablet (10 mg total) by mouth nightly. 90 tablet 3    ergocalciferol 1.25 MG (31189 UT) Oral Cap Take 1 capsule (50,000 Units total) by mouth once a week. Once weekly x 12 weeks. 12 capsule 0      Past Medical History:    Abdominal hernia    Abdominal pain     Anxiety    Bad breath    Belching    Bloating    Blood in the stool    Calculus of kidney    Decorative tattoo    Diarrhea, unspecified    Fatigue    Flatulence/gas pain/belching    Food intolerance    Heartburn    Hemorrhoids    High cholesterol    Hyperlipidemia    Indigestion    Nausea    Wears glasses      Social History:  Social History     Socioeconomic History    Marital status:    Tobacco Use    Smoking status: Never    Smokeless tobacco: Never   Substance and Sexual Activity    Alcohol use: Never    Drug use: Never   Social History Narrative    ** Merged History Encounter **             REVIEW OF SYSTEMS:   GENERAL HEALTH: feels well otherwise  SKIN: denies any unusual skin lesions or rashes  RESPIRATORY: denies shortness of breath with exertion  CARDIOVASCULAR: denies chest pain on exertion  GI: denies abdominal pain and denies heartburn  NEURO: denies headaches      EXAM:   /78   Pulse 68   Resp 16   Ht 5' 10\" (1.778 m)   Wt 180 lb (81.6 kg)   SpO2 98%   BMI 25.83 kg/m²   GENERAL: well developed, well nourished,in no apparent distress  PSYCH: normal affect, good eye contact, appropriate  NEURO: CN intact  NECK: supple no LAD, no thyromegaly   BACK: Left lumbar paraspinal pain  LE: 5+ strength 2+ DTR's normal sensation   HIP: full rom / pain isolated when doing lunges     ASSESSMENT AND PLAN:   1. Obsessive-compulsive disorder, unspecified type  cpm  - sertraline 100 MG Oral Tab; Take 1.5 tablets (150 mg total) by mouth daily.  Dispense: 135 tablet; Refill: 0    2. DWAIN (generalized anxiety disorder)  cpm  - sertraline 100 MG Oral Tab; Take 1.5 tablets (150 mg total) by mouth daily.  Dispense: 135 tablet; Refill: 0    3. Anxiety  cpm  - sertraline 100 MG Oral Tab; Take 1.5 tablets (150 mg total) by mouth daily.  Dispense: 135 tablet; Refill: 0  - propranolol 20 MG Oral Tab; Take 1 tablet (20 mg total) by mouth 3 (three) times daily.  Dispense: 270 tablet; Refill: 0    4. Attention  deficit disorder (ADD) in adult  cpm  - methylphenidate (RITALIN) 20 MG Oral Tab; Take 1 tablet (20 mg total) by mouth 2 (two) times daily.  Dispense: 60 tablet; Refill: 0  - methylphenidate (RITALIN) 20 MG Oral Tab; Take 1 tablet (20 mg total) by mouth 2 (two) times daily.  Dispense: 60 tablet; Refill: 0  - methylphenidate (RITALIN) 20 MG Oral Tab; Take 1 tablet (20 mg total) by mouth 2 (two) times daily.  Dispense: 60 tablet; Refill: 0    5. Lumbar pain    - Physical Therapy Referral - [facility]    6. Bilateral hip pain  Discussed stretching      Potential side effects discussed at length  The patient indicates understanding of these issues and agrees to the plan.  The patient is asked to return in 3 months or sooner if needed.

## 2025-08-05 DIAGNOSIS — F41.9 ANXIETY: ICD-10-CM

## 2025-08-05 DIAGNOSIS — F98.8 ATTENTION DEFICIT DISORDER (ADD) IN ADULT: ICD-10-CM

## 2025-08-05 DIAGNOSIS — F41.1 GAD (GENERALIZED ANXIETY DISORDER): ICD-10-CM

## 2025-08-05 DIAGNOSIS — F42.9 OBSESSIVE-COMPULSIVE DISORDER, UNSPECIFIED TYPE: ICD-10-CM

## 2025-08-05 RX ORDER — ALPRAZOLAM 1 MG/1
TABLET ORAL
Qty: 30 TABLET | Refills: 0 | Status: SHIPPED | OUTPATIENT
Start: 2025-08-05

## 2025-08-05 RX ORDER — PROPRANOLOL HCL 20 MG
20 TABLET ORAL 3 TIMES DAILY
Qty: 270 TABLET | Refills: 0 | Status: SHIPPED | OUTPATIENT
Start: 2025-08-05

## 2025-08-05 RX ORDER — SERTRALINE HYDROCHLORIDE 100 MG/1
150 TABLET, FILM COATED ORAL DAILY
Qty: 135 TABLET | Refills: 0 | Status: SHIPPED | OUTPATIENT
Start: 2025-08-05

## 2025-08-05 RX ORDER — METHYLPHENIDATE HYDROCHLORIDE 20 MG/1
20 TABLET ORAL 2 TIMES DAILY
Qty: 60 TABLET | Refills: 0 | Status: SHIPPED | OUTPATIENT
Start: 2025-08-05 | End: 2025-09-04